# Patient Record
Sex: FEMALE | Race: BLACK OR AFRICAN AMERICAN
[De-identification: names, ages, dates, MRNs, and addresses within clinical notes are randomized per-mention and may not be internally consistent; named-entity substitution may affect disease eponyms.]

---

## 2018-05-28 ENCOUNTER — HOSPITAL ENCOUNTER (EMERGENCY)
Dept: HOSPITAL 62 - ER | Age: 28
Discharge: HOME | End: 2018-05-28
Payer: MEDICAID

## 2018-05-28 VITALS — DIASTOLIC BLOOD PRESSURE: 81 MMHG | SYSTOLIC BLOOD PRESSURE: 140 MMHG

## 2018-05-28 DIAGNOSIS — R30.0: ICD-10-CM

## 2018-05-28 DIAGNOSIS — R10.30: Primary | ICD-10-CM

## 2018-05-28 DIAGNOSIS — M54.5: ICD-10-CM

## 2018-05-28 LAB
ADD MANUAL DIFF: NO
ANION GAP SERPL CALC-SCNC: 10 MMOL/L (ref 5–19)
APPEARANCE UR: (no result)
APTT PPP: YELLOW S
BASOPHILS # BLD AUTO: 0.1 10^3/UL (ref 0–0.2)
BASOPHILS NFR BLD AUTO: 0.7 % (ref 0–2)
BILIRUB UR QL STRIP: NEGATIVE
BUN SERPL-MCNC: 11 MG/DL (ref 7–20)
CALCIUM: 9.7 MG/DL (ref 8.4–10.2)
CHLAM PCR: NOT DETECTED
CHLORIDE SERPL-SCNC: 108 MMOL/L (ref 98–107)
CO2 SERPL-SCNC: 28 MMOL/L (ref 22–30)
EOSINOPHIL # BLD AUTO: 0.1 10^3/UL (ref 0–0.6)
EOSINOPHIL NFR BLD AUTO: 1.1 % (ref 0–6)
EPITHELIALS (WET MOUNT): (no result)
ERYTHROCYTE [DISTWIDTH] IN BLOOD BY AUTOMATED COUNT: 12.9 % (ref 11.5–14)
GLUCOSE SERPL-MCNC: 89 MG/DL (ref 75–110)
GLUCOSE UR STRIP-MCNC: NEGATIVE MG/DL
GON PCR: NOT DETECTED
HCT VFR BLD CALC: 37 % (ref 36–47)
HGB BLD-MCNC: 11.9 G/DL (ref 12–15.5)
KETONES UR STRIP-MCNC: NEGATIVE MG/DL
LYMPHOCYTES # BLD AUTO: 3.1 10^3/UL (ref 0.5–4.7)
LYMPHOCYTES NFR BLD AUTO: 39.1 % (ref 13–45)
MCH RBC QN AUTO: 29.1 PG (ref 27–33.4)
MCHC RBC AUTO-ENTMCNC: 32.3 G/DL (ref 32–36)
MCV RBC AUTO: 90 FL (ref 80–97)
MONOCYTES # BLD AUTO: 0.4 10^3/UL (ref 0.1–1.4)
MONOCYTES NFR BLD AUTO: 5 % (ref 3–13)
NEUTROPHILS # BLD AUTO: 4.3 10^3/UL (ref 1.7–8.2)
NEUTS SEG NFR BLD AUTO: 54.1 % (ref 42–78)
NITRITE UR QL STRIP: NEGATIVE
PH UR STRIP: 6 [PH] (ref 5–9)
PLATELET # BLD: 326 10^3/UL (ref 150–450)
POTASSIUM SERPL-SCNC: 4.1 MMOL/L (ref 3.6–5)
PROT UR STRIP-MCNC: NEGATIVE MG/DL
RBC # BLD AUTO: 4.1 10^6/UL (ref 3.72–5.28)
RBCS (WET MOUNT): (no result)
SODIUM SERPL-SCNC: 145.8 MMOL/L (ref 137–145)
SP GR UR STRIP: 1.01
T.VAGINALIS (WET MOUNT): (no result)
TOTAL CELLS COUNTED % (AUTO): 100 %
UROBILINOGEN UR-MCNC: 2 MG/DL (ref ?–2)
WBC # BLD AUTO: 7.9 10^3/UL (ref 4–10.5)
WBCS (WET MOUNT): (no result)
YEAST (WET MOUNT): (no result)

## 2018-05-28 PROCEDURE — 80048 BASIC METABOLIC PNL TOTAL CA: CPT

## 2018-05-28 PROCEDURE — 85025 COMPLETE CBC W/AUTO DIFF WBC: CPT

## 2018-05-28 PROCEDURE — 99284 EMERGENCY DEPT VISIT MOD MDM: CPT

## 2018-05-28 PROCEDURE — 87591 N.GONORRHOEAE DNA AMP PROB: CPT

## 2018-05-28 PROCEDURE — 87491 CHLMYD TRACH DNA AMP PROBE: CPT

## 2018-05-28 PROCEDURE — 36415 COLL VENOUS BLD VENIPUNCTURE: CPT

## 2018-05-28 PROCEDURE — 81025 URINE PREGNANCY TEST: CPT

## 2018-05-28 PROCEDURE — 87210 SMEAR WET MOUNT SALINE/INK: CPT

## 2018-05-28 PROCEDURE — 81001 URINALYSIS AUTO W/SCOPE: CPT

## 2018-05-28 NOTE — ER DOCUMENT REPORT
ED GI/





- General


Chief Complaint: Abdominal Pain


Stated Complaint: ABDOMINAL PAIN


Time Seen by Provider: 05/28/18 20:49


Notes: 


Patient is 27-year-old female comes emergency department for chief complaint of 

lower abdominal pain for the past 4 days, intermittent but worsening, she 

states she vomited once today, she feels some vague radiation to her lower back 

on both sides, she reports some dysuria.  She denies vaginal bleeding or any 

obvious discharge.  She denies fever or chills.  She reports normal bowel 

movements.  She denies any surgeries, daily medications, or any medical 

history.  She is sexually active with her boyfriend.


TRAVEL OUTSIDE OF THE [place] IN LAST 30 DAYS: No





- Related Data


Allergies/Adverse Reactions: 


 





No Known Allergies Allergy (Verified 05/28/18 19:57)


 











Past Medical History





- General


Information source: Patient





- Social History


Smoking Status: Never Smoker


Frequency of alcohol use: None


Lives with: Family


Family History: Reviewed & Not Pertinent


Patient has suicidal ideation: No


Patient has homicidal ideation: No





- Medical History


Medical History: Negative


Renal/ Medical History: Denies: Hx Peritoneal Dialysis


Surgical Hx: Negative





- Immunizations


Immunizations up to date: Yes


Hx Diphtheria, Pertussis, Tetanus Vaccination: Yes





Review of Systems





- Review of Systems


Constitutional: No symptoms reported


EENT: No symptoms reported


Cardiovascular: No symptoms reported


Respiratory: No symptoms reported


Gastrointestinal: See HPI


Genitourinary: See HPI


Female Genitourinary: See HPI


Musculoskeletal: No symptoms reported


Skin: No symptoms reported


Hematologic/Lymphatic: No symptoms reported


Neurological/Psychological: No symptoms reported





Physical Exam





- Vital signs


Vitals: 


 











Temp Pulse Resp BP Pulse Ox


 


 98.0 F   57 L  14   145/87 H  100 


 


 05/28/18 20:04  05/28/18 20:04  05/28/18 20:04  05/28/18 20:04  05/28/18 20:04











Interpretation: Normal





- General


General appearance: Appears well


In distress: None





- HEENT


Head: Normocephalic, Atraumatic


Eyes: Normal


Pupils: PERRL





- Respiratory


Respiratory status: No respiratory distress


Chest status: Nontender


Breath sounds: Normal


Chest palpation: Normal





- Cardiovascular


Rhythm: Regular


Heart sounds: Normal auscultation


Murmur: No





- Abdominal


Inspection: Normal


Distension: No distension


Bowel sounds: Normal


Tenderness: Tender - Mild generalized suprapubic and pelvic tenderness, no 

guarding, no rigidity, upper abdomen is benign


Organomegaly: No organomegaly





- Genitourinary


External exam: Normal


Speculum exam: Normal, Cervix closed, Vaginal discharge - Minimal.  No: Cervix 

open


Bimanuel exam: Normal.  No: Cervical motion tender


Notes: 





Aimee PCT present during exam





- Back


Back: Normal, Nontender.  No: CVA tenderness





- Extremities


General upper extremity: Normal inspection, Nontender, Normal color, Normal ROM

, Normal temperature


General lower extremity: Normal inspection, Nontender, Normal color, Normal ROM

, Normal temperature, Normal weight bearing.  No: Jorge's sign





- Neurological


Neuro grossly intact: Yes


Cognition: Normal


Orientation: AAOx4


[place] Coma Scale Eye Opening: Spontaneous


[place] Coma Scale Verbal: Oriented


[place]att Coma Scale Motor: Obeys Commands


[place] Coma Scale Total: 15


Speech: Normal


Motor strength normal: LUE, RUE, LLE, RLE


Sensory: Normal





- Psychological


Associated symptoms: Normal affect, Normal mood





- Skin


Skin Temperature: Warm


Skin Moisture: Dry


Skin Color: Normal





Course





- Re-evaluation


Re-evalutation: 


Mild lower abdominal and suprapubic tenderness on exam, otherwise unremarkable 

abdomen.  CBC unremarkable, chemistry unremarkable, pelvic examination and wet 

mount are unremarkable, gonorrhea and chlamydia and still pending but I do not 

suspect this based on her exam and workup to this point.  Urine does suggest 

urinary tract infection, patient has suprapubic tenderness, patient has 

symptoms suggestive of urinary tract infection.  Treated with Pyridium, Keflex, 

discussed follow-up and return precautions, patient states understanding and 

agreement.





- Vital Signs


Vital signs: 


 











Temp Pulse Resp BP Pulse Ox


 


 97.9 F   56 L  16   140/81 H  100 


 


 05/28/18 22:45  05/28/18 22:45  05/28/18 22:45  05/28/18 22:45  05/28/18 22:45














- Laboratory


Result Diagrams: 


 05/28/18 21:25





 05/28/18 21:25


Laboratory results interpreted by me: 


 











  05/28/18 05/28/18 05/28/18





  21:10 21:25 21:25


 


Hgb   11.9 L 


 


Sodium    145.8 H


 


Chloride    108 H


 


Urine Blood  SMALL H  


 


Urine Urobilinogen  2.0 H  


 


Ur Leukocyte Esterase  TRACE H  














Discharge





- Discharge


Clinical Impression: 


 Lower abdominal pain, Dysuria





Condition: Stable


Disposition: HOME, SELF-CARE


Additional Instructions: 


We are treating you for a bladder infection.  Take the Keflex as prescribed to 

completion.  Take the Pyridium if needed for your symptoms of pain.  Consider 

over-the-counter stool softener such as MiraLAX or Colace if needed as well.  

Follow-up with primary care.  Return if you worsen including vomiting, fever of 

100.4 or greater, increased pain, or any other concerning symptoms.


Prescriptions: 


Cephalexin Monohydrate [Keflex 500 mg Capsule] 500 mg PO QID #20 capsule


Phenazopyridine HCl [Pyridium 100 Mg Tablet] 200 mg PO TID PRN #12 tablet


 PRN Reason: 


Forms:  Return to Work, Elevated Blood Pressure


Referrals: 


PAULINO FERGUSON MD [Primary Care Provider] - Follow up as needed